# Patient Record
Sex: MALE | Race: WHITE
[De-identification: names, ages, dates, MRNs, and addresses within clinical notes are randomized per-mention and may not be internally consistent; named-entity substitution may affect disease eponyms.]

---

## 2017-03-09 ENCOUNTER — HOSPITAL ENCOUNTER (OUTPATIENT)
Dept: HOSPITAL 15 - HDHVI->DVH | Age: 67
Discharge: HOME | End: 2017-03-09
Attending: INTERNAL MEDICINE
Payer: MEDICARE

## 2017-03-09 VITALS — BODY MASS INDEX: 21.67 KG/M2 | WEIGHT: 160 LBS | HEIGHT: 72 IN

## 2017-03-09 DIAGNOSIS — Z01.810: Primary | ICD-10-CM

## 2017-03-09 DIAGNOSIS — F17.210: ICD-10-CM

## 2017-03-09 DIAGNOSIS — I25.2: ICD-10-CM

## 2017-03-09 DIAGNOSIS — I25.10: ICD-10-CM

## 2017-03-09 PROCEDURE — 96374 THER/PROPH/DIAG INJ IV PUSH: CPT

## 2017-03-09 PROCEDURE — 93017 CV STRESS TEST TRACING ONLY: CPT

## 2017-03-09 PROCEDURE — 78452 HT MUSCLE IMAGE SPECT MULT: CPT

## 2017-03-14 ENCOUNTER — HOSPITAL ENCOUNTER (OUTPATIENT)
Dept: HOSPITAL 15 - RAD HDHVI | Age: 67
Discharge: HOME | End: 2017-03-14
Attending: INTERNAL MEDICINE
Payer: MEDICARE

## 2017-03-14 DIAGNOSIS — Z95.5: Primary | ICD-10-CM

## 2017-03-14 PROCEDURE — 93306 TTE W/DOPPLER COMPLETE: CPT

## 2017-09-18 ENCOUNTER — HOSPITAL ENCOUNTER (OUTPATIENT)
Dept: HOSPITAL 15 - LAB | Age: 67
Discharge: HOME | End: 2017-09-18
Attending: INTERNAL MEDICINE
Payer: MEDICARE

## 2017-09-18 DIAGNOSIS — I25.10: ICD-10-CM

## 2017-09-18 DIAGNOSIS — R97.20: Primary | ICD-10-CM

## 2017-09-18 DIAGNOSIS — I10: ICD-10-CM

## 2017-09-18 PROCEDURE — 84153 ASSAY OF PSA TOTAL: CPT

## 2018-08-06 ENCOUNTER — HOSPITAL ENCOUNTER (OUTPATIENT)
Dept: HOSPITAL 15 - RAD HDHVI | Age: 68
Discharge: HOME | End: 2018-08-06
Attending: INTERNAL MEDICINE
Payer: MEDICARE

## 2018-08-06 VITALS — WEIGHT: 160 LBS | HEIGHT: 72 IN | BODY MASS INDEX: 21.67 KG/M2

## 2018-08-06 DIAGNOSIS — I10: ICD-10-CM

## 2018-08-06 DIAGNOSIS — E03.9: ICD-10-CM

## 2018-08-06 DIAGNOSIS — D51.9: ICD-10-CM

## 2018-08-06 DIAGNOSIS — Z00.01: Primary | ICD-10-CM

## 2018-08-06 DIAGNOSIS — C61: ICD-10-CM

## 2018-08-06 DIAGNOSIS — E11.9: ICD-10-CM

## 2018-08-06 DIAGNOSIS — N39.0: ICD-10-CM

## 2018-08-06 DIAGNOSIS — E29.1: ICD-10-CM

## 2018-08-06 DIAGNOSIS — R07.89: ICD-10-CM

## 2018-08-06 DIAGNOSIS — E55.9: ICD-10-CM

## 2018-08-06 LAB
ALBUMIN SERPL-MCNC: 3.7 G/DL (ref 3.4–5)
ALP SERPL-CCNC: 78 U/L (ref 45–117)
ALT SERPL-CCNC: 28 U/L (ref 16–61)
ANION GAP SERPL CALCULATED.3IONS-SCNC: 4 MMOL/L (ref 5–15)
BILIRUB SERPL-MCNC: 0.5 MG/DL (ref 0.2–1)
BUN SERPL-MCNC: 19 MG/DL (ref 7–18)
BUN/CREAT SERPL: 17.9
CALCIUM SERPL-MCNC: 8.9 MG/DL (ref 8.5–10.1)
CHLORIDE SERPL-SCNC: 106 MMOL/L (ref 98–107)
CHOLEST SERPL-MCNC: 164 MG/DL (ref ?–200)
CO2 SERPL-SCNC: 27 MMOL/L (ref 21–32)
GLUCOSE SERPL-MCNC: 105 MG/DL (ref 74–106)
HCT VFR BLD AUTO: 42.4 % (ref 41–53)
HDLC SERPL-MCNC: 35 MG/DL (ref 40–59)
HGB BLD-MCNC: 14 G/DL (ref 13.5–17.5)
MCH RBC QN AUTO: 29.7 PG (ref 28–32)
MCV RBC AUTO: 89.8 FL (ref 80–100)
NRBC BLD QL AUTO: 0.4 %
POTASSIUM SERPL-SCNC: 4.1 MMOL/L (ref 3.5–5.1)
PROT SERPL-MCNC: 7.4 G/DL (ref 6.4–8.2)
SODIUM SERPL-SCNC: 137 MMOL/L (ref 136–145)
TRIGL SERPL-MCNC: 179 MG/DL (ref ?–150)

## 2018-08-06 PROCEDURE — 83036 HEMOGLOBIN GLYCOSYLATED A1C: CPT

## 2018-08-06 PROCEDURE — 80053 COMPREHEN METABOLIC PANEL: CPT

## 2018-08-06 PROCEDURE — 80061 LIPID PANEL: CPT

## 2018-08-06 PROCEDURE — 82607 VITAMIN B-12: CPT

## 2018-08-06 PROCEDURE — 85025 COMPLETE CBC W/AUTO DIFF WBC: CPT

## 2018-08-06 PROCEDURE — 84403 ASSAY OF TOTAL TESTOSTERONE: CPT

## 2018-08-06 PROCEDURE — 36415 COLL VENOUS BLD VENIPUNCTURE: CPT

## 2018-08-06 PROCEDURE — 81003 URINALYSIS AUTO W/O SCOPE: CPT

## 2018-08-06 PROCEDURE — 84443 ASSAY THYROID STIM HORMONE: CPT

## 2018-08-06 PROCEDURE — 84153 ASSAY OF PSA TOTAL: CPT

## 2018-08-06 PROCEDURE — 78452 HT MUSCLE IMAGE SPECT MULT: CPT

## 2018-08-06 PROCEDURE — 96374 THER/PROPH/DIAG INJ IV PUSH: CPT

## 2018-08-06 PROCEDURE — 82306 VITAMIN D 25 HYDROXY: CPT

## 2018-08-06 PROCEDURE — 93017 CV STRESS TEST TRACING ONLY: CPT

## 2018-08-06 PROCEDURE — 84439 ASSAY OF FREE THYROXINE: CPT

## 2018-08-07 LAB
PSA SERPL-MCNC: 0.07 NG/ML (ref 0–4)
T4 FREE SERPL-MCNC: 1.13 NG/DL (ref 0.89–1.76)

## 2018-08-08 ENCOUNTER — HOSPITAL ENCOUNTER (OUTPATIENT)
Dept: HOSPITAL 15 - RAD HDHVI | Age: 68
Discharge: HOME | End: 2018-08-08
Attending: INTERNAL MEDICINE
Payer: MEDICARE

## 2018-08-08 DIAGNOSIS — I10: Primary | ICD-10-CM

## 2018-08-08 DIAGNOSIS — E78.5: ICD-10-CM

## 2018-08-08 PROCEDURE — 93880 EXTRACRANIAL BILAT STUDY: CPT

## 2019-01-25 ENCOUNTER — HOSPITAL ENCOUNTER (OUTPATIENT)
Dept: HOSPITAL 15 - RAD HDHVI | Age: 69
Discharge: HOME | End: 2019-01-25
Attending: INTERNAL MEDICINE
Payer: MEDICARE

## 2019-01-25 VITALS — SYSTOLIC BLOOD PRESSURE: 155 MMHG | DIASTOLIC BLOOD PRESSURE: 75 MMHG

## 2019-01-25 VITALS — DIASTOLIC BLOOD PRESSURE: 74 MMHG | SYSTOLIC BLOOD PRESSURE: 137 MMHG

## 2019-01-25 DIAGNOSIS — J43.8: Primary | ICD-10-CM

## 2019-01-25 DIAGNOSIS — I25.10: ICD-10-CM

## 2019-01-25 PROCEDURE — 82565 ASSAY OF CREATININE: CPT

## 2019-01-25 PROCEDURE — 71260 CT THORAX DX C+: CPT

## 2019-01-25 NOTE — NUR
[facility]

 PT TO [facility] FOR IV INSERT FOR CT CHEST. HX STENTS, CAD, COPD. QUIT SMOKING X 50 YRS . 
QUIT 1/11/19

## 2019-01-25 NOTE — NUR
CHF

 IV removal

IV DC'd with sterile technique, catheter fully intact. Pressure dressing applied to site. 
Patient tolerated procedure well. INCREASE WATER INTAKE TODAY TO AID IN CONTRAST CLEARANCE.  
Discharged with aftercare instructions per MD.

NOTE:

## 2019-01-25 NOTE — NUR
CHF IV insertion

IV access obtained, via clean sterile technique by inserting 22 gauge catheter at  after  
attempt(s). IV secured properly. No trauma to site. Patient tolerated procedure well.

## 2019-04-12 ENCOUNTER — HOSPITAL ENCOUNTER (OUTPATIENT)
Dept: HOSPITAL 15 - LAB | Age: 69
Discharge: HOME | End: 2019-04-12
Attending: INTERNAL MEDICINE
Payer: MEDICARE

## 2019-04-12 DIAGNOSIS — E55.9: ICD-10-CM

## 2019-04-12 DIAGNOSIS — E03.9: Primary | ICD-10-CM

## 2019-04-12 DIAGNOSIS — Z79.899: ICD-10-CM

## 2019-04-12 DIAGNOSIS — I10: ICD-10-CM

## 2019-04-12 DIAGNOSIS — C61: ICD-10-CM

## 2019-04-12 DIAGNOSIS — E29.1: ICD-10-CM

## 2019-04-12 LAB
ALBUMIN SERPL-MCNC: 4 G/DL (ref 3.4–5)
ALP SERPL-CCNC: 75 U/L (ref 45–117)
ALT SERPL-CCNC: 29 U/L (ref 16–61)
ANION GAP SERPL CALCULATED.3IONS-SCNC: 8 MMOL/L (ref 5–15)
BILIRUB DIRECT SERPL-MCNC: 0.1 MG/DL (ref 0–0.2)
BILIRUB SERPL-MCNC: 0.4 MG/DL (ref 0.2–1)
BUN SERPL-MCNC: 23 MG/DL (ref 7–18)
BUN/CREAT SERPL: 20.9
CALCIUM SERPL-MCNC: 9.2 MG/DL (ref 8.5–10.1)
CHLORIDE SERPL-SCNC: 108 MMOL/L (ref 98–107)
CHOLEST SERPL-MCNC: 211 MG/DL (ref ?–200)
CO2 SERPL-SCNC: 24 MMOL/L (ref 21–32)
GLUCOSE SERPL-MCNC: 98 MG/DL (ref 74–106)
HCT VFR BLD AUTO: 44.8 % (ref 41–53)
HDLC SERPL-MCNC: 38 MG/DL (ref 40–59)
HGB BLD-MCNC: 14.9 G/DL (ref 13.5–17.5)
MCH RBC QN AUTO: 30.4 PG (ref 28–32)
MCV RBC AUTO: 91.4 FL (ref 80–100)
NRBC BLD QL AUTO: 1.3 %
POTASSIUM SERPL-SCNC: 4.3 MMOL/L (ref 3.5–5.1)
PROT SERPL-MCNC: 7.5 G/DL (ref 6.4–8.2)
PSA SERPL-MCNC: 0.13 NG/ML (ref 0–4)
SODIUM SERPL-SCNC: 140 MMOL/L (ref 136–145)
T4 FREE SERPL-MCNC: 1.05 NG/DL (ref 0.89–1.76)
TRIGL SERPL-MCNC: 413 MG/DL (ref ?–150)

## 2019-04-12 PROCEDURE — 84439 ASSAY OF FREE THYROXINE: CPT

## 2019-04-12 PROCEDURE — 84443 ASSAY THYROID STIM HORMONE: CPT

## 2019-04-12 PROCEDURE — 80048 BASIC METABOLIC PNL TOTAL CA: CPT

## 2019-04-12 PROCEDURE — 83036 HEMOGLOBIN GLYCOSYLATED A1C: CPT

## 2019-04-12 PROCEDURE — 80061 LIPID PANEL: CPT

## 2019-04-12 PROCEDURE — 80076 HEPATIC FUNCTION PANEL: CPT

## 2019-04-12 PROCEDURE — 84153 ASSAY OF PSA TOTAL: CPT

## 2019-04-12 PROCEDURE — 36415 COLL VENOUS BLD VENIPUNCTURE: CPT

## 2019-04-12 PROCEDURE — 82306 VITAMIN D 25 HYDROXY: CPT

## 2019-04-12 PROCEDURE — 84403 ASSAY OF TOTAL TESTOSTERONE: CPT

## 2019-04-12 PROCEDURE — 85025 COMPLETE CBC W/AUTO DIFF WBC: CPT

## 2019-08-01 ENCOUNTER — HOSPITAL ENCOUNTER (OUTPATIENT)
Dept: HOSPITAL 15 - RAD HDHVI | Age: 69
Discharge: HOME | End: 2019-08-01
Attending: INTERNAL MEDICINE
Payer: MEDICARE

## 2019-08-01 DIAGNOSIS — I50.22: ICD-10-CM

## 2019-08-01 DIAGNOSIS — J44.9: Primary | ICD-10-CM

## 2019-08-01 DIAGNOSIS — I11.0: ICD-10-CM

## 2019-08-01 PROCEDURE — 93306 TTE W/DOPPLER COMPLETE: CPT

## 2019-08-14 ENCOUNTER — HOSPITAL ENCOUNTER (OUTPATIENT)
Dept: HOSPITAL 15 - RAD HDHVI | Age: 69
Discharge: HOME | End: 2019-08-14
Attending: INTERNAL MEDICINE
Payer: MEDICARE

## 2019-08-14 VITALS — HEIGHT: 72 IN | WEIGHT: 160 LBS | BODY MASS INDEX: 21.67 KG/M2

## 2019-08-14 DIAGNOSIS — J44.9: ICD-10-CM

## 2019-08-14 DIAGNOSIS — Z87.891: ICD-10-CM

## 2019-08-14 DIAGNOSIS — E78.00: ICD-10-CM

## 2019-08-14 DIAGNOSIS — E78.1: ICD-10-CM

## 2019-08-14 DIAGNOSIS — E11.65: Primary | ICD-10-CM

## 2019-08-14 PROCEDURE — 96374 THER/PROPH/DIAG INJ IV PUSH: CPT

## 2019-08-14 PROCEDURE — 78452 HT MUSCLE IMAGE SPECT MULT: CPT

## 2019-08-14 PROCEDURE — 93017 CV STRESS TEST TRACING ONLY: CPT
